# Patient Record
Sex: FEMALE
[De-identification: names, ages, dates, MRNs, and addresses within clinical notes are randomized per-mention and may not be internally consistent; named-entity substitution may affect disease eponyms.]

---

## 2018-09-06 ENCOUNTER — HOSPITAL ENCOUNTER (OUTPATIENT)
Dept: HOSPITAL 92 - BICCT | Age: 78
Discharge: HOME | End: 2018-09-06
Attending: OTOLARYNGOLOGY
Payer: MEDICARE

## 2018-09-06 DIAGNOSIS — E21.0: Primary | ICD-10-CM

## 2018-09-06 DIAGNOSIS — D35.1: ICD-10-CM

## 2018-09-06 LAB — ESTIMATED GFR-MDRD - POC: 69

## 2018-09-06 PROCEDURE — 70492 CT SFT TSUE NCK W/O & W/DYE: CPT

## 2018-09-06 PROCEDURE — 82565 ASSAY OF CREATININE: CPT

## 2018-10-30 ENCOUNTER — HOSPITAL ENCOUNTER (OUTPATIENT)
Dept: HOSPITAL 92 - BICULT | Age: 78
Discharge: HOME | End: 2018-10-30
Attending: OTOLARYNGOLOGY
Payer: MEDICARE

## 2018-10-30 DIAGNOSIS — C73: Primary | ICD-10-CM

## 2018-10-30 PROCEDURE — 76536 US EXAM OF HEAD AND NECK: CPT

## 2018-10-30 NOTE — ULT
THYROID ULTRASOUND: 

10/30/18

 

HISTORY: 

Right thyroid lobectomy performed 1.5 weeks ago. Hyperparathyroidism and right thyroid mass removal. 


 

TECHNIQUE:  

Multiplanar gray scale and color doppler images were obtained in a thyroid ultrasound. 

 

FINDINGS:  

The right lobe of the thyroid has been removed. The left lobe is homogeneous in appearance without fo
mainor nodules or cysts and measures 2.2 cm in length. 

 

IMPRESSION:  

Residual left thyroid lobe. 

 

POS: [facility]

## 2019-11-27 ENCOUNTER — HOSPITAL ENCOUNTER (OUTPATIENT)
Dept: HOSPITAL 92 - BICCT | Age: 79
Discharge: HOME | End: 2019-11-27
Attending: OTOLARYNGOLOGY
Payer: MEDICARE

## 2019-11-27 DIAGNOSIS — E21.0: Primary | ICD-10-CM

## 2019-11-27 DIAGNOSIS — E89.0: ICD-10-CM

## 2019-11-27 DIAGNOSIS — M47.812: ICD-10-CM

## 2019-11-27 LAB — ESTIMATED GFR-MDRD - POC: 60

## 2019-11-27 PROCEDURE — 82565 ASSAY OF CREATININE: CPT

## 2019-11-27 PROCEDURE — 70492 CT SFT TSUE NCK W/O & W/DYE: CPT

## 2019-11-27 NOTE — CT
CT NECK WITH AND WITHOUT CONTRAST:

(PARATHYROID PROTOCOL)

 

DATE:  11/27/19 

 

HISTORY:  

79-year-old female with hyperparathyroidism and hypercalcemia. 

 

TECHNIQUE:  

IV contrast:  100 mL [id number]. 

Precontrast scan, 25 second postcontrast scan, and 65 second postcontrast scan, were obtained from 5.
5 cm inferior to the heath to the inferior edge of the orbits. 

Coronal and sagittal reconstructions obtained. 

 

FINDINGS:

Right lobe of thyroid gland is absent. There is a 0.5 x 1 x 1 cm intermediate density piece of tissue
 in the right thyroid bed at the right tracheoesophageal groove, which has no intrinsic iodine. Howev
er, it does not enhance, and therefore is not consistent with a parathyroid adenoma. 

 

There is a residual left thyroid lobe. At the C7-T1 level, a distance of approximately 0.5 - 1 cm pos
terior to the posterior edge of the mid pole of the left lobe of the thyroid gland, there is an appro
ximately 1 x 0.5 x 0.5 cm piece of tissue broadly abutting the left side of the upper esophagus, with
 no intrinsic iodine, and moderately strong enhancement that is a very good candidate for a parathyro
id adenoma (axial images 58 of 127, series 2; 39 of 85, series 3; 39 of 85, series 7; coronal images 
60 of 109, series 5; 60 of 105, series 9; sagittal images 28 of 75, series 10; 28 of 78, series 6). 

 

Very tortuous right internal carotid with retropharyngeal course. Grade I anterolisthesis of C6 on C7
 due to bilateral facet DJD. Severe degenerative disc disease at C6-7. 

 

 

IMPRESSION: 

 

1.  Left paraesophageal lesion is a very good candidate for parathyroid adenoma at the level of the c
ervicothoracic junction. 

 

2.  Status post right thyroid lobectomy. 

 

3.  Nonenhancing piece of tissue in the contralateral right tracheoesophageal groove should not be co
nfused for parathyroid adenoma. It may represent scar tissue.

 

4.  High grade cervical spondylosis at C6-7.

 

 

 

 

POS: [facility]

## 2023-11-14 ENCOUNTER — HOSPITAL ENCOUNTER (OUTPATIENT)
Dept: HOSPITAL 92 - CSHMRI | Age: 83
Discharge: HOME | End: 2023-11-14
Attending: STUDENT IN AN ORGANIZED HEALTH CARE EDUCATION/TRAINING PROGRAM
Payer: MEDICARE

## 2023-11-14 DIAGNOSIS — G31.89: ICD-10-CM

## 2023-11-14 DIAGNOSIS — H90.3: Primary | ICD-10-CM

## 2023-11-14 LAB — EGFRCR SERPLBLD CKD-EPI 2021: 63 ML/MIN/{1.73_M2}

## 2023-11-14 PROCEDURE — 82565 ASSAY OF CREATININE: CPT

## 2023-11-14 PROCEDURE — 70553 MRI BRAIN STEM W/O & W/DYE: CPT
